# Patient Record
Sex: MALE | Race: WHITE
[De-identification: names, ages, dates, MRNs, and addresses within clinical notes are randomized per-mention and may not be internally consistent; named-entity substitution may affect disease eponyms.]

---

## 2019-04-29 ENCOUNTER — TRANSCRIPTION ENCOUNTER (OUTPATIENT)
Age: 34
End: 2019-04-29

## 2019-05-24 ENCOUNTER — TRANSCRIPTION ENCOUNTER (OUTPATIENT)
Age: 34
End: 2019-05-24

## 2024-06-02 PROBLEM — Z00.00 ENCOUNTER FOR PREVENTIVE HEALTH EXAMINATION: Status: ACTIVE | Noted: 2024-06-02

## 2024-06-14 ENCOUNTER — APPOINTMENT (OUTPATIENT)
Dept: PSYCHIATRY | Facility: CLINIC | Age: 39
End: 2024-06-14
Payer: COMMERCIAL

## 2024-06-14 DIAGNOSIS — R45.4 IRRITABILITY AND ANGER: ICD-10-CM

## 2024-06-14 DIAGNOSIS — F43.22 ADJUSTMENT DISORDER WITH ANXIETY: ICD-10-CM

## 2024-06-14 PROCEDURE — 99205 OFFICE O/P NEW HI 60 MIN: CPT

## 2024-06-14 NOTE — HISTORY OF PRESENT ILLNESS
[FreeTextEntry1] :  Pt seen by writer for initial appointment on 6/6/2024. ISTOP Reference #: [id number] nothing listed  38 yo M, , domiciled with wife, 7 year old son and 6 year old daughter, employed as a [employer].  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  Pt said that his wife says that he is an angry person. Pt said that at home he yells a lot, yells at his parents, gets frustrated. Has been going on for "a good few years, maybe 4-5." Pt is seeing a psychiatrist today since he is out of work and wants to work on himself.   Working as [employer], but has not worked since October. Unemployment benefits ended April 2024, now working part time and drawing down on savings. Pt's daughter has seizures, and the workup is ongoing.    Mood: "not terrible." Anhedonia: denies Motivation: good Sleep: asleep 11pm, woke up 6:30am with children, some interruptions because shut off the AC last night but usually minimal interruptions Appetite: good Energy: lower than would like it, a little Concentration: good Feelings of guilt/worthlessness: denies SI: denies past or current  NSSI:  denies past or current  HI:  denies past or current    Anxiety: denies being generally anxious, but environmental stimuli cause anxiety such as traffic or stress with dealing with childcare. Muscle soreness: denies Irritability: endorses Easily fatigued: denies Restless/keyed up/tense: denies Focus: good Sleep disturbance: asleep 11pm, woke up 6:30am with children, some interruptions because shut off the AC last night but usually minimal interruptions Panic attacks:  denies past or current   Disordered eating Hx: denies past or current restricting/binging/purging   EtOH use: 2-3 beers a few times a month Drug use: denies past or current except MJ once or twice remotely Tobacco use: denies Caffeine use: sometimes has iced tea   Current or past psychosis: denies past or current Current or past dex: denies past or current   Trauma: denies   Firearm access: denies Current medications: Benfiber Therapy: not currently in therapy  [FreeTextEntry2] : Denies  Denies ever feeling depressed. [FreeTextEntry3] : Denies

## 2024-06-14 NOTE — PLAN
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  On initial assessment, the differential includes adjustment disorder with anxiety, irritability, r/o learning disability with reduced frustration tolerance (given history of special education and low frustration tolerance which may indicate an underlying learning disability, though pt has not had formal neuropsych testing completed). Pt does not require medications at this time. Pt was offered therapy referral resources so that pt can connect with a therapist to develop coping skills and improve frustration tolerance. Reviewed available resources to connecting with a therapist.  Labs: 3/11/2021: CBC wnl, CMP wnl, A1c 5.4%, TSH 2.07 wnl, chol 186 wnl, trig 173 H, HDL 40 L, LDL calc 112 H   PLAN -Discussed the diagnosis, treatment, alternatives to recommended treatment, risk Vs benefits of treatment and no treatment and alternative treatments. -Lab/other tests: advised pt to obtain bloodwork with PCP annual visit -Medication: not required at this time -Discussed recommendation for aerobic exercise -Discussed sleep hygiene -Educated patient of importance of remaining abstinent from drugs and alcohol. -Emergency procedures were discussed: pt. educated to call 911 or go to nearest ER for worsening of symptoms/suicidal/homicidal ideation. -Offered referrals for individual psychotherapy to learn coping skills -Pt does not need to return to writer[facility] clinic for a follow up at this time, but can schedule a follow up should symptoms of depression/anxiety worsen. -Patient given opportunity to ask questions and their questions were answered and they expressed understanding and agreement with above plan.  I spent a total of 60 minutes reviewing past medical records, evaluating the patient, discussing treatment options with the patient, prescribing medication, and documenting in the EMR.

## 2024-06-14 NOTE — DISCUSSION/SUMMARY
[Low acute suicide risk] : Low acute suicide risk [No] : No [FreeTextEntry1] : RISK ASSESSMENT Acute risk factors for self-injurious/aggressive behaviors include: irritability, inconsistent work, financial stress Chronic risk factors include: male, hx special education Protective factors include: domiciled, , responsibility to family, denies past or current SIIP/NSSIIP/HIIP, no past SAs, no past NSSIB, sobriety, help-seeking. Overall, the pt is at a low acute and low chronic risk for self-injurious, suicidal or aggressive behaviors, and is appropriate for outpatient care at this time.

## 2024-06-14 NOTE — PHYSICAL EXAM
[None] : none [Cooperative] : cooperative [Euthymic] : euthymic [Full] : full [Clear] : clear [Linear/Goal Directed] : linear/goal directed [Average] : average [WNL] : within normal limits [FreeTextEntry7] : denies SIIP/NSSIIP/HIIP at this time.

## 2024-06-14 NOTE — SOCIAL HISTORY
[FreeTextEntry1] : Born: [place] Grew up: [place], mother father, 2 years older brother and 4 years younger brother. Father: good relationship, he coached pt's baseball team. Mother: good relationship, emotionally supportive. Younger brother was smart and athletic. Older brother: sometimes would be annoying, felt like he could do no wrong. Pt felt that if he spoke up he would be in the wrong. Education/GPA: [employer] for high school, GPA around 75-80s received "resource room" special education, pt had hearing aids and took speech class. 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